# Patient Record
Sex: FEMALE | Race: BLACK OR AFRICAN AMERICAN
[De-identification: names, ages, dates, MRNs, and addresses within clinical notes are randomized per-mention and may not be internally consistent; named-entity substitution may affect disease eponyms.]

---

## 2018-05-22 ENCOUNTER — HOSPITAL ENCOUNTER (EMERGENCY)
Dept: HOSPITAL 17 - NEPK | Age: 28
Discharge: HOME | End: 2018-05-22
Payer: SELF-PAY

## 2018-05-22 VITALS
OXYGEN SATURATION: 98 % | RESPIRATION RATE: 16 BRPM | DIASTOLIC BLOOD PRESSURE: 63 MMHG | HEART RATE: 80 BPM | SYSTOLIC BLOOD PRESSURE: 111 MMHG | TEMPERATURE: 98.4 F

## 2018-05-22 VITALS — WEIGHT: 143.3 LBS | BODY MASS INDEX: 23.03 KG/M2 | HEIGHT: 66 IN

## 2018-05-22 DIAGNOSIS — N30.01: Primary | ICD-10-CM

## 2018-05-22 LAB
BACTERIA #/AREA URNS HPF: (no result) /HPF
COLOR UR: YELLOW
GLUCOSE UR STRIP-MCNC: (no result) MG/DL
HGB UR QL STRIP: (no result)
KETONES UR STRIP-MCNC: (no result) MG/DL
MUCOUS THREADS #/AREA URNS LPF: (no result) /LPF
NITRITE UR QL STRIP: (no result)
SP GR UR STRIP: 1.02 (ref 1–1.03)
SQUAMOUS #/AREA URNS HPF: 1 /HPF (ref 0–5)
TRANS CELLS #/AREA URNS HPF: 2 /HPF
URINE LEUKOCYTE ESTERASE: (no result)

## 2018-05-22 PROCEDURE — 81001 URINALYSIS AUTO W/SCOPE: CPT

## 2018-05-22 PROCEDURE — 87077 CULTURE AEROBIC IDENTIFY: CPT

## 2018-05-22 PROCEDURE — 87086 URINE CULTURE/COLONY COUNT: CPT

## 2018-05-22 PROCEDURE — 87186 SC STD MICRODIL/AGAR DIL: CPT

## 2018-05-22 PROCEDURE — 84703 CHORIONIC GONADOTROPIN ASSAY: CPT

## 2018-05-22 PROCEDURE — 99283 EMERGENCY DEPT VISIT LOW MDM: CPT

## 2018-05-22 NOTE — PD
HPI


Chief Complaint:   Complaint


Time Seen by Provider:  15:56


Travel History


International Travel<30 days:  No


Contact w/Intl Traveler<30days:  No


Traveled to known affect area:  No





History of Present Illness


HPI


28-year-old female presents the ED for evaluation of 3 day history of urinary 

urgency, urinary frequency and streaky hematuria.  Patient denies fever, chills

, nausea, vomiting, abdominal pain, back pain.  She denies vaginal discharge or 

vaginal odor.  Unsure of her last menstrual period secondary to birth control 

use.  She states symptoms are similar to previous UTI.  Treated at home with 

Azo with some improvement of symptoms.





PFSH


Past Medical History


Pregnant?:  Not Pregnant





Social History


Alcohol Use:  No


Tobacco Use:  No


Substance Use:  No





Allergies-Medications


(Allergen,Severity, Reaction):  


Coded Allergies:  


     *MDRO Multi-Drug Resistant Organism (Unverified  Allergy, Unknown, 2/3/15)


 MRSA 2014


Reported Meds & Prescriptions





Reported Meds & Active Scripts


Active


Macrobid (Nitrofurantoin Monohydrate Macrocrystals) 100 Mg Capsule 100 Mg PO 

BID 5 Days


Debrox (Carbamide Peroxide) 6.5 % Soln 5 Drop AS BID 5 Days


Amoxicillin 875 Mg Tab 875 Mg PO BID 10 Days


Keflex (Cephalexin Monohydrate) 500 Mg Cap 500 Mg PO Q8 10 Days


Bactrim DS (Sulfamethoxazole-Trimethoprim DS) 1 Tab Tab 1 Tab PO BID 10 Days








Review of Systems


Except as stated in HPI:  all other systems reviewed are Neg





Physical Exam


Narrative


GENERAL: Well-nourished, well-developed thin -American female no acute 

distress.


SKIN: Focused skin assessment warm/dry.


HEAD: Normocephalic.


EYES: No scleral icterus. No injection or drainage. 


NECK: Supple, trachea midline. No JVD or lymphadenopathy.


CARDIOVASCULAR: Regular rate and rhythm without murmurs, gallops, or rubs. 


RESPIRATORY: Breath sounds equal bilaterally. No accessory muscle use.


GASTROINTESTINAL: Abdomen soft, non-tender, nondistended.  No suprapubic 

tenderness.


MUSCULOSKELETAL: No cyanosis, or edema. 


BACK: Nontender without obvious deformity. No CVA tenderness.





Data


Data


Last Documented VS





Vital Signs








  Date Time  Temp Pulse Resp B/P (MAP) Pulse Ox O2 Delivery O2 Flow Rate FiO2


 


5/22/18 13:48 98.4 80 16 111/63 (79) 98   








Orders





 Orders


Urinalysis - C+S If Indicated (5/22/18 13:50)


Ed Urine Pregnancytest Poc (5/22/18 13:50)


Urine Culture (5/22/18 14:05)


Nitrofurantoin Monohyd Macrocr (Macrobid (5/22/18 16:30)


Ed Discharge Order (5/22/18 16:18)





Labs





Laboratory Tests








Test


  5/22/18


14:05


 


Urine Color YELLOW 


 


Urine Turbidity HAZY 


 


Urine pH 8.0 


 


Urine Specific Gravity 1.021 


 


Urine Protein 30 mg/dL 


 


Urine Glucose (UA) NEG mg/dL 


 


Urine Ketones NEG mg/dL 


 


Urine Occult Blood MOD 


 


Urine Nitrite NEG 


 


Urine Bilirubin NEG 


 


Urine Urobilinogen 2.0 MG/DL 


 


Urine Leukocyte Esterase SMALL 


 


Urine  /hpf 


 


Urine WBC 35 /hpf 


 


Urine Squamous Epithelial


Cells 1 /hpf 


 


 


Urine Transitional Epithelial


Cells 2 /hpf 


 


 


Urine Bacteria OCC /hpf 


 


Urine Mucus FEW /lpf 


 


Microscopic Urinalysis Comment


  CULTURE


INDICATED











MDM


Medical Decision Making


Medical Screen Exam Complete:  Yes


Emergency Medical Condition:  Yes


Differential Diagnosis


Cystitis versus pyelonephritis versus STI versus pregnancy versus other


Narrative Course


28-year-old female presents the ED for evaluation of 3 day history of urinary 

urgency, urinary frequency and streaky hematuria.  Unsure of LMP secondary to 

contraceptive use.  Vitals reviewed.  Patient's afebrile on presentation.  

Physical exam is unremarkable.  No CVA tenderness.  No suprapubic tenderness.  

UA hazy, moderate occult blood, small leukocyte Estrace, 155 RBCs, 35 WBCs, 

occasional bacteria, culture pending.  I reviewed the patient's records, you no 

urine culture available.  Patient's prescribed Macrobid twice daily 5 days, 

first dose administered in the ED.  She is instructed to take all medication 

until it is gone, return for worsening symptoms.  She indicated understanding 

the instructions.  She is stable and discharged home.





Diagnosis





 Primary Impression:  


 Acute hemorrhagic cystitis


Referrals:  


Primary Care Physician





***Additional Instructions:  


Rest, hydrate.


Begin antibiotics today and take them until every pill is gone.


Follow with your primary care provider.


Return to the ED for worsening symptoms or any urgent or emergent medical 

condition.


***Med/Other Pt SpecificInfo:  Prescription(s) given


Scripts


Nitrofurantoin Monohydrate Macrocrystals (Macrobid) 100 Mg Capsule


100 MG PO BID for Infection for 5 Days, #10 CAP 0 Refills


   Prov: Hird,Diana May MD         5/22/18


Disposition:  01 DISCHARGE HOME


Condition:  Stable











Brandie Swan May 22, 2018 16:18